# Patient Record
Sex: MALE | Race: WHITE | NOT HISPANIC OR LATINO | Employment: FULL TIME | URBAN - NONMETROPOLITAN AREA
[De-identification: names, ages, dates, MRNs, and addresses within clinical notes are randomized per-mention and may not be internally consistent; named-entity substitution may affect disease eponyms.]

---

## 2021-08-22 ENCOUNTER — HOSPITAL ENCOUNTER (EMERGENCY)
Facility: HOSPITAL | Age: 46
Discharge: HOME | End: 2021-08-23
Attending: EMERGENCY MEDICINE | Admitting: EMERGENCY MEDICINE

## 2021-08-22 DIAGNOSIS — R45.851 SUICIDAL IDEATION: ICD-10-CM

## 2021-08-22 DIAGNOSIS — F10.920 ALCOHOLIC INTOXICATION WITHOUT COMPLICATION (HCC): Primary | ICD-10-CM

## 2021-08-22 LAB
ALBUMIN SERPL-MCNC: 5 G/DL (ref 3.5–5.2)
ALBUMIN/GLOB SERPL: 1.4 G/DL
ALP SERPL-CCNC: 91 U/L (ref 39–117)
ALT SERPL W P-5'-P-CCNC: 55 U/L (ref 1–41)
AMPHET+METHAMPHET UR QL: NEGATIVE
AMPHETAMINES UR QL: NEGATIVE
ANION GAP SERPL CALCULATED.3IONS-SCNC: 17 MMOL/L (ref 5–15)
APAP SERPL-MCNC: <5 MCG/ML (ref 0–30)
AST SERPL-CCNC: 46 U/L (ref 1–40)
BARBITURATES UR QL SCN: NEGATIVE
BASOPHILS # BLD AUTO: 0.06 10*3/MM3 (ref 0–0.2)
BASOPHILS NFR BLD AUTO: 1.1 % (ref 0–1.5)
BENZODIAZ UR QL SCN: NEGATIVE
BILIRUB SERPL-MCNC: 0.2 MG/DL (ref 0–1.2)
BUN SERPL-MCNC: 10 MG/DL (ref 6–20)
BUN/CREAT SERPL: 12.7 (ref 7–25)
BUPRENORPHINE SERPL-MCNC: NEGATIVE NG/ML
CALCIUM SPEC-SCNC: 9.8 MG/DL (ref 8.6–10.5)
CANNABINOIDS SERPL QL: NEGATIVE
CHLORIDE SERPL-SCNC: 104 MMOL/L (ref 98–107)
CO2 SERPL-SCNC: 20 MMOL/L (ref 22–29)
COCAINE UR QL: NEGATIVE
CREAT SERPL-MCNC: 0.79 MG/DL (ref 0.76–1.27)
DEPRECATED RDW RBC AUTO: 41.8 FL (ref 37–54)
EOSINOPHIL # BLD AUTO: 0.18 10*3/MM3 (ref 0–0.4)
EOSINOPHIL NFR BLD AUTO: 3.2 % (ref 0.3–6.2)
ERYTHROCYTE [DISTWIDTH] IN BLOOD BY AUTOMATED COUNT: 12.3 % (ref 12.3–15.4)
ETHANOL BLD-MCNC: 272 MG/DL (ref 0–10)
ETHANOL BLD-MCNC: 352 MG/DL (ref 0–10)
ETHANOL UR QL: 0.27 %
ETHANOL UR QL: 0.35 %
GFR SERPL CREATININE-BSD FRML MDRD: 105 ML/MIN/1.73
GLOBULIN UR ELPH-MCNC: 3.7 GM/DL
GLUCOSE SERPL-MCNC: 104 MG/DL (ref 65–99)
HCT VFR BLD AUTO: 47.4 % (ref 37.5–51)
HGB BLD-MCNC: 16.3 G/DL (ref 13–17.7)
HOLD SPECIMEN: NORMAL
HOLD SPECIMEN: NORMAL
IMM GRANULOCYTES # BLD AUTO: 0.04 10*3/MM3 (ref 0–0.05)
IMM GRANULOCYTES NFR BLD AUTO: 0.7 % (ref 0–0.5)
LYMPHOCYTES # BLD AUTO: 1.98 10*3/MM3 (ref 0.7–3.1)
LYMPHOCYTES NFR BLD AUTO: 35.5 % (ref 19.6–45.3)
MAGNESIUM SERPL-MCNC: 2.3 MG/DL (ref 1.6–2.6)
MCH RBC QN AUTO: 31.8 PG (ref 26.6–33)
MCHC RBC AUTO-ENTMCNC: 34.4 G/DL (ref 31.5–35.7)
MCV RBC AUTO: 92.6 FL (ref 79–97)
METHADONE UR QL SCN: NEGATIVE
MONOCYTES # BLD AUTO: 0.34 10*3/MM3 (ref 0.1–0.9)
MONOCYTES NFR BLD AUTO: 6.1 % (ref 5–12)
NEUTROPHILS NFR BLD AUTO: 2.97 10*3/MM3 (ref 1.7–7)
NEUTROPHILS NFR BLD AUTO: 53.4 % (ref 42.7–76)
NRBC BLD AUTO-RTO: 0 /100 WBC (ref 0–0.2)
OPIATES UR QL: NEGATIVE
OXYCODONE UR QL SCN: NEGATIVE
PCP UR QL SCN: NEGATIVE
PLATELET # BLD AUTO: 220 10*3/MM3 (ref 140–450)
PMV BLD AUTO: 9.6 FL (ref 6–12)
POTASSIUM SERPL-SCNC: 4.4 MMOL/L (ref 3.5–5.2)
PROPOXYPH UR QL: NEGATIVE
PROT SERPL-MCNC: 8.7 G/DL (ref 6–8.5)
RBC # BLD AUTO: 5.12 10*6/MM3 (ref 4.14–5.8)
SALICYLATES SERPL-MCNC: <0.3 MG/DL
SARS-COV-2 RNA PNL SPEC NAA+PROBE: NOT DETECTED
SODIUM SERPL-SCNC: 141 MMOL/L (ref 136–145)
TRICYCLICS UR QL SCN: NEGATIVE
WBC # BLD AUTO: 5.57 10*3/MM3 (ref 3.4–10.8)
WHOLE BLOOD HOLD SPECIMEN: NORMAL

## 2021-08-22 PROCEDURE — 80143 DRUG ASSAY ACETAMINOPHEN: CPT | Performed by: PHYSICIAN ASSISTANT

## 2021-08-22 PROCEDURE — 99283 EMERGENCY DEPT VISIT LOW MDM: CPT

## 2021-08-22 PROCEDURE — 82077 ASSAY SPEC XCP UR&BREATH IA: CPT | Performed by: EMERGENCY MEDICINE

## 2021-08-22 PROCEDURE — 82077 ASSAY SPEC XCP UR&BREATH IA: CPT | Performed by: PHYSICIAN ASSISTANT

## 2021-08-22 PROCEDURE — 80179 DRUG ASSAY SALICYLATE: CPT | Performed by: PHYSICIAN ASSISTANT

## 2021-08-22 PROCEDURE — 93005 ELECTROCARDIOGRAM TRACING: CPT | Performed by: PHYSICIAN ASSISTANT

## 2021-08-22 PROCEDURE — 36415 COLL VENOUS BLD VENIPUNCTURE: CPT

## 2021-08-22 PROCEDURE — 83735 ASSAY OF MAGNESIUM: CPT | Performed by: PHYSICIAN ASSISTANT

## 2021-08-22 PROCEDURE — 80053 COMPREHEN METABOLIC PANEL: CPT | Performed by: PHYSICIAN ASSISTANT

## 2021-08-22 PROCEDURE — 80306 DRUG TEST PRSMV INSTRMNT: CPT | Performed by: EMERGENCY MEDICINE

## 2021-08-22 PROCEDURE — 85025 COMPLETE CBC W/AUTO DIFF WBC: CPT | Performed by: PHYSICIAN ASSISTANT

## 2021-08-22 PROCEDURE — C9803 HOPD COVID-19 SPEC COLLECT: HCPCS | Performed by: PHYSICIAN ASSISTANT

## 2021-08-22 PROCEDURE — 87635 SARS-COV-2 COVID-19 AMP PRB: CPT | Performed by: PHYSICIAN ASSISTANT

## 2021-08-22 RX ORDER — TRAZODONE HYDROCHLORIDE 100 MG/1
100 TABLET ORAL NIGHTLY
COMMUNITY

## 2021-08-22 RX ORDER — LOSARTAN POTASSIUM 50 MG/1
100 TABLET ORAL DAILY
COMMUNITY

## 2021-08-22 RX ORDER — SERTRALINE HYDROCHLORIDE 100 MG/1
100 TABLET, FILM COATED ORAL DAILY
COMMUNITY

## 2021-08-22 RX ORDER — OMEPRAZOLE 40 MG/1
40 CAPSULE, DELAYED RELEASE ORAL DAILY
COMMUNITY

## 2021-08-22 RX ORDER — LOSARTAN POTASSIUM 50 MG/1
100 TABLET ORAL ONCE
Status: COMPLETED | OUTPATIENT
Start: 2021-08-22 | End: 2021-08-22

## 2021-08-22 RX ADMIN — SERTRALINE HYDROCHLORIDE 100 MG: 50 TABLET ORAL at 22:22

## 2021-08-22 RX ADMIN — LOSARTAN POTASSIUM 100 MG: 50 TABLET, FILM COATED ORAL at 22:22

## 2021-08-22 NOTE — ED PROVIDER NOTES
Subjective   Patient is a 45-year-old male history of anxiety, depression, GERD, hypertension and PTSD presenting to the ER for evaluation of alcohol intoxication and suicidal ideations.  Most of the HPI is obtained from RNs report because patient has been uncooperative and does not want to answer questions or be cooperative with exam.  He does admit to drinking alcohol.  He states he said thoughts of harming himself per RN by taking an overdose of his medications.  No further HPI unable to be obtained at this time.          Review of Systems   Unable to perform ROS: Other   Constitutional: Negative.    HENT: Negative.    Eyes: Negative.    Respiratory: Negative.    Cardiovascular: Negative.    Gastrointestinal: Negative.    Genitourinary: Negative.    Musculoskeletal: Negative.    Skin: Negative.    Allergic/Immunologic: Negative for immunocompromised state.   Neurological: Negative.    Psychiatric/Behavioral: Positive for agitation and suicidal ideas.       Past Medical History:   Diagnosis Date   • Anxiety    • Depression    • GERD (gastroesophageal reflux disease)    • Hypertension    • PTSD (post-traumatic stress disorder)        No Known Allergies    Past Surgical History:   Procedure Laterality Date   • HERNIA REPAIR         History reviewed. No pertinent family history.    Social History     Socioeconomic History   • Marital status:      Spouse name: Not on file   • Number of children: Not on file   • Years of education: Not on file   • Highest education level: Not on file   Tobacco Use   • Smoking status: Current Every Day Smoker     Packs/day: 1.00     Types: Cigarettes   Substance and Sexual Activity   • Alcohol use: Yes     Alcohol/week: 30.0 standard drinks     Types: 30 Cans of beer per week   • Drug use: Never   • Sexual activity: Defer           Objective   Physical Exam  Vitals and nursing note reviewed.     /100 Comment: States normally high, states anxious to go home. Denies CP or  "HA. States will monitor BP at home.   Pulse 102   Temp 98 °F (36.7 °C)   Resp 18   Ht 185.4 cm (73\")   Wt 99.8 kg (220 lb)   SpO2 98%   BMI 29.03 kg/m²     GEN: No acute distress, ambulating around the room.  Patient appears anxious.  He is yelling at the security to leave.  He has been very threatening.  Head: Normocephalic, atraumatic  Eyes: EOM intact  ENT: Mask in place per protocol  Chest: Nontender to palpation  Cardiovascular: Mild tachycardia, regular rhythm  Lungs: Clear to auscultation bilaterally without adventitious sounds  Abdomen: Soft, nontender, nondistended, no peritoneal signs, no guarding  Extremities: No edema, normal appearance  Neuro: GCS 15  Psych: Mood and affect are appropriate    ECG 12 Lead      Date/Time: 8/23/2021 1:31 AM  Performed by: Kathleen Miranda MD  Authorized by: Tamy Campos PA-C   Interpreted by physician  Comparison: not compared with previous ECG   Rhythm: sinus tachycardia  Rate: tachycardic  BPM: 109  QRS axis: normal  Conduction: conduction normal  ST Segments: ST segments normal  T Waves: T waves normal  Other: no other findings  Clinical impression comment: Sinus tachycardia without acute ischemic changes, no QTC prolongation to suggest ingestion                   ED Course  ED Course as of Aug 23 0134   Sun Aug 22, 2021   1800 Salicylate: <0.3 [LA]   1800 Acetaminophen: <5.0 [LA]   1800 Ethanol(!): 352 [LA]   1800 Magnesium: 2.3 [LA]   1800 WBC: 5.57 [LA]   1800 Hemoglobin: 16.3 [LA]   1800 Platelets: 220 [LA]   1800 Glucose(!): 104 [LA]   1800 BUN: 10 [LA]   1800 Creatinine: 0.79 [LA]   1800 Potassium: 4.4 [LA]   1800 Chloride: 104 [LA]   1800 CO2(!): 20.0 [LA]   1800 Calcium: 9.8 [LA]   1800 Total Protein(!): 8.7 [LA]   1800 Albumin: 5.00 [LA]   1800 ALT (SGPT)(!): 55 [LA]   1800 AST (SGOT)(!): 46 [LA]   1800 Alkaline Phosphatase: 91 [LA]   1800 Total Bilirubin: 0.2 [LA]   1800 eGFR Non  Am: 105 [LA]   1800 Globulin: 3.7 [LA]   1800 " BUN/Creatinine Ratio: 12.7 [LA]   1800 Anion Gap(!): 17.0 [LA]   1800 THC Screen, Urine: Negative [LA]   1815 EKG interpreted by me reveals sinus tachycardia with a rate of 109 bpm.  There is low QRS voltage in chest leads.  This is an abnormal appearing EKG.    [TB]   1817 Phencyclidine (PCP), Urine: Negative [LA]   1817 Cocaine Screen, Urine: Negative [LA]   1817 Methamphetamine, Ur: Negative [LA]   1817 Opiate Screen: Negative [LA]   1817 Amphetamine, Urine Qual: Negative [LA]   1817 Benzodiazepine Screen, Urine: Negative [LA]   1817 Tricyclic Antidepressants Screen: Negative [LA]   1817 Methadone Screen , Urine: Negative [LA]   1817 Barbiturates Screen, Urine: Negative [LA]   1817 Oxycodone Screen, Urine: Negative [LA]   1817 Propoxyphene Screen: Negative [LA]   1818 Buprenorphine, Screen, Urine: Negative [LA]   1818 COVID19: Not Detected [LA]   2017 Ethanol(!): 272 [LA]   2200 Patient care handed to Dr. Miranda    [LA]   Mon Aug 23, 2021   0129 ECG 12 Lead [HS]      ED Course User Index  [HS] Kathleen Miranda MD  [LA] Tamy Campos PA-C  [TB] Tati Dunn MD                                           MDM  Number of Diagnoses or Management Options  Diagnosis management comments: On arrival, patient is hypertensive.  He is mildly tachycardic.  He is otherwise stable.  He does seem to be intoxicated, has been uncooperative.  He did report to the nurse that he has suicidal ideations with plan.  Will obtain basic labs, salicylate, ethanol, acetaminophen level, UDS, EKG, magnesium for medical clearance.    Patient had ethanol level that was elevated at 352.  He had mild elevation of ALT and AST with normal bilirubin.  No other significant electrolyte abnormalities.  UDS was negative.  We will continue to monitor the patient.  He will need to sober up before Behavioral Health can evaluate him.  We will plan to repeat ethanol level.        Amount and/or Complexity of Data Reviewed  Clinical lab tests:  reviewed    Risk of Complications, Morbidity, and/or Mortality  General comments: 45-year-old male [employer]  presents with suicidal ideation and alcohol intoxication.  He has been in the ER for 8 hours.  He was belligerent and wanting to leave earlier.  Now he is clinically sober in room is fully oriented walks without any ataxia and does not have slurred speech.  Over the last 2 hours his suicidal ideation and his belligerence have resolved entirely.  He says that he does not have a plan or any intent.  He had been in rehab in [place] [employer] and says that he knows how to seek sobriety.  He does live alone and is  but his children recently moved from [place] to [place] so he feels like they are closer and is hopeful.  His parents are in the area and he feels like he has a good relationship with them.  No psychotic symptoms.  Will discharge to outpatient care with return precautions.  He does admit to heavy alcohol use but says that he knows how to get to AA and a sobriety program.  No signs and symptoms of withdrawal.  He readily agrees to return should he start feeling despondent again.  He said the precipitant was the collapse of [place] and some of the futility that many veterans are feeling right now that served in that area.  He is forward thinking and works in dialysis.  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        Final diagnoses:   Alcoholic intoxication without complication (CMS/HCC)   Suicidal ideation       ED Disposition  ED Disposition     ED Disposition Condition Comment    Discharge Stable           PATIENT [facility] [address]  [phone]  Schedule an appointment as soon as possible for a visit       System, Provider Not In  [facility] [address]    Schedule an appointment as soon as possible for a visit in 1 week  Schedule follow-up appointment with the [facility] - [facility]  [address] [place] [address] Dr Virgilio Lorenzana  [id number]  [phone]  Schedule an appointment as soon as possible for a visit in 1 week           Medication List      No changes were made to your prescriptions during this visit.          Kathleen Miranda MD  08/23/21 0510

## 2021-08-23 VITALS
HEIGHT: 73 IN | BODY MASS INDEX: 29.16 KG/M2 | DIASTOLIC BLOOD PRESSURE: 100 MMHG | SYSTOLIC BLOOD PRESSURE: 170 MMHG | RESPIRATION RATE: 18 BRPM | HEART RATE: 102 BPM | TEMPERATURE: 98 F | WEIGHT: 220 LBS | OXYGEN SATURATION: 98 %

## 2021-08-23 LAB
ETHANOL BLD-MCNC: 158 MG/DL (ref 0–10)
ETHANOL UR QL: 0.16 %

## 2021-08-23 NOTE — DISCHARGE INSTRUCTIONS
[facility] for [facility] of your concerns about recent global events and for further treatment of your alcohol dependence.  Please return to the emergency department for any further thoughts of harming yourself or anyone else.  Thank you for letting us take care of you today.

## 2021-08-23 NOTE — ED NOTES
Pt standing in doorway near security. Repeatedly stating that he wants to go home. Advised cannot due to claims when he arrived. For his safety cannot give him his phone or allow him to go home. Pt continued to ask for phone and go home. Security at bedside.      Criselda Schuster RN  08/22/21 2007

## 2021-08-23 NOTE — ED NOTES
Monitor tech advised security with pt but pt attempting to leave room and come get cell phone. Advised to contact Criselda Villatoro RN  08/22/21 2007

## 2022-10-15 NOTE — ED NOTES
Contact [facility] for [employer] Officer per RN request.     [place] April Criselda  08/22/21 2010     symmetrical